# Patient Record
Sex: FEMALE | Race: WHITE | NOT HISPANIC OR LATINO | Employment: PART TIME | URBAN - METROPOLITAN AREA
[De-identification: names, ages, dates, MRNs, and addresses within clinical notes are randomized per-mention and may not be internally consistent; named-entity substitution may affect disease eponyms.]

---

## 2020-07-19 ENCOUNTER — LAB VISIT (OUTPATIENT)
Dept: PRIMARY CARE CLINIC | Facility: CLINIC | Age: 65
End: 2020-07-19

## 2020-07-19 DIAGNOSIS — Z00.6 RESEARCH STUDY PATIENT: ICD-10-CM

## 2020-07-19 PROCEDURE — U0003 INFECTIOUS AGENT DETECTION BY NUCLEIC ACID (DNA OR RNA); SEVERE ACUTE RESPIRATORY SYNDROME CORONAVIRUS 2 (SARS-COV-2) (CORONAVIRUS DISEASE [COVID-19]), AMPLIFIED PROBE TECHNIQUE, MAKING USE OF HIGH THROUGHPUT TECHNOLOGIES AS DESCRIBED BY CMS-2020-01-R: HCPCS

## 2020-07-19 PROCEDURE — 86769 SARS-COV-2 COVID-19 ANTIBODY: CPT

## 2020-07-19 NOTE — RESEARCH
Date of Consent: 7/19/2020    Sponsor: [facility]    Study Title/IRB Number: Observational study of Sars-CoV2 Immunoglobulin G (IgG) seroprevalence among the [facility] population over time 2020.163  Principle Investigator: Ladonna Bashir, PhD    Did the patient need translation services? No   name: N/A    Prior to the Informed Consent (IC) being signed, or any study protocol required data collection, testing, procedure, or intervention being performed, the following was done and/or discussed:   Patient was given a paper copy of the IC for review    Patient was given study FAQ   Purpose of the study and qualifications to participate    Study design and tests or procedures done at this visit   Confidentiality and HIPAA Authorization for Release of Medical Records for the research trial/ subject's rights/research related injury   Risk, Benefits, Alternative Treatments, Compensation and Costs   Participation in the research trial is voluntary and patient may withdraw at anytime   Contact information for study related questions    Patient verbalizes understanding of the above: Yes  Contact information for PI and IRB given to patient: Yes  Patient able to adequately summarize: the purpose of the study, the risks associated with the study, and all procedures, testing, and follow-ups associated with the study: Yes    The consent was discussed verbally with the patient and all questions were answered satisfactorily. Patient gave verbal consent for the Seroprevalence research study with an IRB approval date of 06/23/2020.      The Consent, Consent Witness and name of Clinical Research Coordinator consenting was captured and documented in REDCap.    All Inclusion and Exclusion Criteria reviewed, subject meets all Inclusion criteria and does not meet any Exclusion Criteria at this time.     Patient Eligibility was confirmed.    Patient responded to survey questions.    The following biospecimen  collection procedures were collected:    -Nasopharyngeal Swab Collection  -Blood collection

## 2020-07-20 LAB — SARS-COV-2 IGG SERPLBLD QL IA.RAPID: NEGATIVE

## 2020-07-23 LAB — SARS-COV-2 RNA RESP QL NAA+PROBE: NOT DETECTED

## 2021-10-13 ENCOUNTER — OFFICE VISIT (OUTPATIENT)
Dept: PODIATRY | Facility: CLINIC | Age: 66
End: 2021-10-13
Payer: COMMERCIAL

## 2021-10-13 VITALS — WEIGHT: 250 LBS | HEIGHT: 63 IN | BODY MASS INDEX: 44.3 KG/M2

## 2021-10-13 DIAGNOSIS — L03.032 PARONYCHIA OF SECOND TOE OF LEFT FOOT: Primary | ICD-10-CM

## 2021-10-13 PROCEDURE — 11750 EXCISION NAIL&NAIL MATRIX: CPT | Mod: T1,S$GLB,, | Performed by: PODIATRIST

## 2021-10-13 PROCEDURE — 99203 OFFICE O/P NEW LOW 30 MIN: CPT | Mod: 25,S$GLB,, | Performed by: PODIATRIST

## 2021-10-13 PROCEDURE — 99203 PR OFFICE/OUTPT VISIT, NEW, LEVL III, 30-44 MIN: ICD-10-PCS | Mod: 25,S$GLB,, | Performed by: PODIATRIST

## 2021-10-13 PROCEDURE — 11750 PR REMOVAL OF NAIL BED: ICD-10-PCS | Mod: T1,S$GLB,, | Performed by: PODIATRIST

## 2021-10-13 PROCEDURE — 99999 PR PBB SHADOW E&M-EST. PATIENT-LVL III: ICD-10-PCS | Mod: PBBFAC,,, | Performed by: PODIATRIST

## 2021-10-13 PROCEDURE — 99999 PR PBB SHADOW E&M-EST. PATIENT-LVL III: CPT | Mod: PBBFAC,,, | Performed by: PODIATRIST

## 2021-10-13 RX ORDER — LEVOTHYROXINE SODIUM 150 MCG
150 TABLET ORAL DAILY
COMMUNITY
Start: 2021-08-21 | End: 2021-11-23 | Stop reason: SDUPTHER

## 2021-10-13 RX ORDER — LISINOPRIL 10 MG/1
10 TABLET ORAL DAILY
COMMUNITY
Start: 2021-08-21 | End: 2021-11-23 | Stop reason: SDUPTHER

## 2021-11-15 ENCOUNTER — OFFICE VISIT (OUTPATIENT)
Dept: PODIATRY | Facility: CLINIC | Age: 66
End: 2021-11-15
Payer: COMMERCIAL

## 2021-11-15 VITALS — WEIGHT: 250 LBS | HEIGHT: 63 IN | BODY MASS INDEX: 44.3 KG/M2

## 2021-11-15 DIAGNOSIS — S90.211A SUBUNGUAL HEMATOMA OF GREAT TOE OF RIGHT FOOT, INITIAL ENCOUNTER: ICD-10-CM

## 2021-11-15 DIAGNOSIS — S90.222A SUBUNGUAL HEMATOMA OF SECOND TOE OF LEFT FOOT, INITIAL ENCOUNTER: ICD-10-CM

## 2021-11-15 DIAGNOSIS — S90.221A SUBUNGUAL HEMATOMA OF SECOND TOE OF RIGHT FOOT, INITIAL ENCOUNTER: ICD-10-CM

## 2021-11-15 DIAGNOSIS — B35.1 ONYCHOMYCOSIS DUE TO DERMATOPHYTE: Primary | ICD-10-CM

## 2021-11-15 PROCEDURE — 99214 PR OFFICE/OUTPT VISIT, EST, LEVL IV, 30-39 MIN: ICD-10-PCS | Mod: S$GLB,,, | Performed by: PODIATRIST

## 2021-11-15 PROCEDURE — 99999 PR PBB SHADOW E&M-EST. PATIENT-LVL III: CPT | Mod: PBBFAC,,, | Performed by: PODIATRIST

## 2021-11-15 PROCEDURE — 99214 OFFICE O/P EST MOD 30 MIN: CPT | Mod: S$GLB,,, | Performed by: PODIATRIST

## 2021-11-15 PROCEDURE — 99999 PR PBB SHADOW E&M-EST. PATIENT-LVL III: ICD-10-PCS | Mod: PBBFAC,,, | Performed by: PODIATRIST

## 2021-11-15 RX ORDER — TERBINAFINE HYDROCHLORIDE 250 MG/1
250 TABLET ORAL DAILY
Qty: 90 TABLET | Refills: 0 | Status: SHIPPED | OUTPATIENT
Start: 2021-11-15 | End: 2022-02-13

## 2021-11-23 PROBLEM — F41.1 GAD (GENERALIZED ANXIETY DISORDER): Status: ACTIVE | Noted: 2021-11-23

## 2021-11-23 PROBLEM — E03.8 OTHER SPECIFIED HYPOTHYROIDISM: Status: ACTIVE | Noted: 2021-11-23

## 2021-11-23 PROBLEM — I10 PRIMARY HYPERTENSION: Status: ACTIVE | Noted: 2021-11-23

## 2022-04-24 ENCOUNTER — HOSPITAL ENCOUNTER (EMERGENCY)
Facility: OTHER | Age: 67
Discharge: HOME | End: 2022-04-25
Attending: EMERGENCY MEDICINE
Payer: COMMERCIAL

## 2022-04-24 DIAGNOSIS — R55 SYNCOPE: ICD-10-CM

## 2022-04-24 DIAGNOSIS — F10.920 ALCOHOLIC INTOXICATION WITHOUT COMPLICATION: Primary | ICD-10-CM

## 2022-04-24 LAB
ALBUMIN SERPL BCP-MCNC: 3.8 G/DL (ref 3.5–5.2)
ALP SERPL-CCNC: 79 U/L (ref 55–135)
ALT SERPL W/O P-5'-P-CCNC: 18 U/L (ref 10–44)
ANION GAP SERPL CALC-SCNC: 14 MMOL/L (ref 8–16)
AST SERPL-CCNC: 17 U/L (ref 10–40)
BASOPHILS # BLD AUTO: 0.02 K/UL (ref 0–0.2)
BASOPHILS NFR BLD: 0.3 % (ref 0–1.9)
BILIRUB SERPL-MCNC: 0.5 MG/DL (ref 0.1–1)
BUN SERPL-MCNC: 12 MG/DL (ref 8–23)
CALCIUM SERPL-MCNC: 9.1 MG/DL (ref 8.7–10.5)
CHLORIDE SERPL-SCNC: 105 MMOL/L (ref 95–110)
CO2 SERPL-SCNC: 21 MMOL/L (ref 23–29)
CREAT SERPL-MCNC: 0.7 MG/DL (ref 0.5–1.4)
DIFFERENTIAL METHOD: ABNORMAL
EOSINOPHIL # BLD AUTO: 0 K/UL (ref 0–0.5)
EOSINOPHIL NFR BLD: 0.2 % (ref 0–8)
ERYTHROCYTE [DISTWIDTH] IN BLOOD BY AUTOMATED COUNT: 12.2 % (ref 11.5–14.5)
EST. GFR  (AFRICAN AMERICAN): >60 ML/MIN/1.73 M^2
EST. GFR  (NON AFRICAN AMERICAN): >60 ML/MIN/1.73 M^2
ETHANOL SERPL-MCNC: 204 MG/DL
GLUCOSE SERPL-MCNC: 126 MG/DL (ref 70–110)
HCT VFR BLD AUTO: 46.9 % (ref 37–48.5)
HGB BLD-MCNC: 15.6 G/DL (ref 12–16)
IMM GRANULOCYTES # BLD AUTO: 0.02 K/UL (ref 0–0.04)
IMM GRANULOCYTES NFR BLD AUTO: 0.3 % (ref 0–0.5)
LYMPHOCYTES # BLD AUTO: 1.2 K/UL (ref 1–4.8)
LYMPHOCYTES NFR BLD: 18.4 % (ref 18–48)
MCH RBC QN AUTO: 31.9 PG (ref 27–31)
MCHC RBC AUTO-ENTMCNC: 33.3 G/DL (ref 32–36)
MCV RBC AUTO: 96 FL (ref 82–98)
MONOCYTES # BLD AUTO: 0.4 K/UL (ref 0.3–1)
MONOCYTES NFR BLD: 6.4 % (ref 4–15)
NEUTROPHILS # BLD AUTO: 4.6 K/UL (ref 1.8–7.7)
NEUTROPHILS NFR BLD: 74.4 % (ref 38–73)
NRBC BLD-RTO: 0 /100 WBC
PLATELET # BLD AUTO: 242 K/UL (ref 150–450)
PMV BLD AUTO: 9.2 FL (ref 9.2–12.9)
POCT GLUCOSE: 109 MG/DL (ref 70–110)
POTASSIUM SERPL-SCNC: 3.8 MMOL/L (ref 3.5–5.1)
PROT SERPL-MCNC: 7.1 G/DL (ref 6–8.4)
RBC # BLD AUTO: 4.89 M/UL (ref 4–5.4)
SODIUM SERPL-SCNC: 140 MMOL/L (ref 136–145)
WBC # BLD AUTO: 6.24 K/UL (ref 3.9–12.7)

## 2022-04-24 PROCEDURE — 96361 HYDRATE IV INFUSION ADD-ON: CPT

## 2022-04-24 PROCEDURE — 93010 ELECTROCARDIOGRAM REPORT: CPT | Mod: ,,, | Performed by: INTERNAL MEDICINE

## 2022-04-24 PROCEDURE — 80053 COMPREHEN METABOLIC PANEL: CPT | Performed by: EMERGENCY MEDICINE

## 2022-04-24 PROCEDURE — 99284 EMERGENCY DEPT VISIT MOD MDM: CPT | Mod: 25

## 2022-04-24 PROCEDURE — 82077 ASSAY SPEC XCP UR&BREATH IA: CPT | Performed by: EMERGENCY MEDICINE

## 2022-04-24 PROCEDURE — 85025 COMPLETE CBC W/AUTO DIFF WBC: CPT | Performed by: EMERGENCY MEDICINE

## 2022-04-24 PROCEDURE — 93005 ELECTROCARDIOGRAM TRACING: CPT

## 2022-04-24 PROCEDURE — 96360 HYDRATION IV INFUSION INIT: CPT

## 2022-04-24 PROCEDURE — 82962 GLUCOSE BLOOD TEST: CPT

## 2022-04-24 PROCEDURE — 93010 EKG 12-LEAD: ICD-10-PCS | Mod: ,,, | Performed by: INTERNAL MEDICINE

## 2022-04-24 PROCEDURE — 25000003 PHARM REV CODE 250: Performed by: EMERGENCY MEDICINE

## 2022-04-24 RX ADMIN — SODIUM CHLORIDE 1000 ML: 0.9 INJECTION, SOLUTION INTRAVENOUS at 08:04

## 2022-04-25 VITALS
WEIGHT: 293 LBS | TEMPERATURE: 98 F | OXYGEN SATURATION: 99 % | DIASTOLIC BLOOD PRESSURE: 95 MMHG | HEART RATE: 71 BPM | RESPIRATION RATE: 13 BRPM | BODY MASS INDEX: 48.82 KG/M2 | HEIGHT: 65 IN | SYSTOLIC BLOOD PRESSURE: 136 MMHG

## 2022-04-25 NOTE — ED PROVIDER NOTES
"Encounter Date: 4/24/2022    SCRIBE #1 NOTE: I, Brody Marley, am scribing for, and in the presence of, Anival English MD.       History     Chief Complaint   Patient presents with    Alcohol Intoxication     Pt found at [facility] +ETOH.  Pt took at home Seroquel this morning as prescribed     Time seen by provider: 8:55 PM    This is a 66 y.o. female, with a PMHx of HTN and hypothyroidism, who presents to the ED with alcohol intoxication. Patient states she is from [place] and came to [place] to meet up with her friends from [place]. She reports drinking 4-5 drinks of liquor today and then "blacking out." Patient is unsure of what happened. She denies headache, neck pain, dysuria, cough, congestion or rhinorrhea. Patient notes she also went out 2 nights ago and drank more than she did today and still felt okay. She mentions that she does not usually drink alcohol. However, her father passed away 4 weeks ago, and she has been having a hard time. Patient states her daily dosage of Lexapro was recently changed from 10 mg to 20 mg 4 days ago. She took the medication this morning. Patient endorses medical compliance to her blood pressure medication Lisinopril. She is allergic to Ceftin. Patient reports smoking cigars occasionally, but denies any street drug use. She notes she had weight loss surgery 2 years ago. Patient does not have a PMHx of seizures. This is the extent of the patient's complaints at this time.    The history is provided by the patient.     Review of patient's allergies indicates:   Allergen Reactions    Cefuroxime axetil Hives, Itching, Rash and Swelling     Past Medical History:   Diagnosis Date    Anxiety     Anxiety state 4/28/2017 1:13:05 PM    [facility] - [facility]: Anxiety-No Additional Notes    Arthritis     Breast disorder     Complications affecting other specified body systems, hypertension 3/13/2012 1:13:09 PM    [facility] Historical - " Cardiovascular: Hypertension-No Additional Notes    Hypertension     Hypothyroidism     Hypothyroidism 4/28/2017 1:13:01 PM    [facility] Historical - DO NOT USE: Hypothyroidism-No Additional Notes    Shingles     2011     Past Surgical History:   Procedure Laterality Date    BREAST BIOPSY  12/2021    gastric sleeve  2019    HYSTERECTOMY  2012    [facility] BSO- DUB, cervical dysplasia    KNEE SURGERY      THYROIDECTOMY       Family History   Problem Relation Age of Onset    Epilepsy Mother     Cancer Mother     Heart disease Mother     Hypertension Mother     COPD Father     Heart disease Father     Arthritis Father     Diabetes Maternal Grandmother     Diabetes type I Maternal Grandmother     Heart disease Maternal Grandfather     Stroke Paternal Grandmother     Breast cancer Maternal Aunt      Social History     Tobacco Use    Smoking status: Never Smoker    Smokeless tobacco: Never Used   Substance Use Topics    Alcohol use: Not Currently    Drug use: Never     Review of Systems   Constitutional: Negative for chills and fever.   HENT: Negative for congestion, rhinorrhea and sore throat.    Eyes: Negative for photophobia and redness.   Respiratory: Negative for cough and shortness of breath.    Cardiovascular: Negative for chest pain.   Gastrointestinal: Negative for abdominal pain, nausea and vomiting.   Genitourinary: Negative for dysuria.   Musculoskeletal: Negative for neck pain.   Skin: Negative for rash.   Neurological: Negative for weakness, light-headedness and headaches.   Psychiatric/Behavioral: Negative for confusion.       Physical Exam     Initial Vitals [04/24/22 2128]   BP Pulse Resp Temp SpO2   (!) 155/90 (!) 59 18 98 °F (36.7 °C) 99 %      MAP       --         Physical Exam    Nursing note and vitals reviewed.  Constitutional: She appears well-developed and well-nourished. She is not diaphoretic. No distress.   HENT:   Head: Normocephalic and atraumatic.   Mouth/Throat:  Oropharynx is clear and moist and mucous membranes are normal.   Sticky mucous membranes.    Eyes: Conjunctivae and EOM are normal. Pupils are equal, round, and reactive to light. No scleral icterus.   Neck: Neck supple.   Normal range of motion.  Cardiovascular: Normal rate, regular rhythm, S1 normal and S2 normal.   No murmur heard.  Pulmonary/Chest: Breath sounds normal. No respiratory distress.   Abdominal: Abdomen is soft. Bowel sounds are normal. There is no abdominal tenderness.   Musculoskeletal:         General: No tenderness or edema. Normal range of motion.      Cervical back: Normal range of motion and neck supple.     Lymphadenopathy:     She has no cervical adenopathy.   Neurological: She is alert and oriented to person, place, and time.   Skin: Skin is warm and dry. Capillary refill takes less than 2 seconds. No rash noted. No pallor.   Psychiatric: She has a normal mood and affect. Thought content normal.         ED Course   Procedures  Labs Reviewed   CBC W/ AUTO DIFFERENTIAL - Abnormal; Notable for the following components:       Result Value    MCH 31.9 (*)     Gran % 74.4 (*)     All other components within normal limits   COMPREHENSIVE METABOLIC PANEL - Abnormal; Notable for the following components:    CO2 21 (*)     Glucose 126 (*)     All other components within normal limits   ALCOHOL,MEDICAL (ETHANOL) - Abnormal; Notable for the following components:    Alcohol, Serum 204 (*)     All other components within normal limits   POCT GLUCOSE     EKG Readings: (Independently Interpreted)   Normal sinus rhythm at rate of 60 bpm. No acute ST/T wave changes. No STEMI.      ECG Results          EKG 12-lead (Final result)  Result time 04/25/22 21:40:40    Final result by Interface, Lab In [facility] (04/25/22 21:40:40)                 Narrative:    Test Reason : R55,    Vent. Rate : 060 BPM     Atrial Rate : 060 BPM     P-R Int : 198 ms          QRS Dur : 084 ms      QT Int : 444 ms       P-R-T Axes : 061  070 039 degrees     QTc Int : 444 ms    Normal sinus rhythm  Low voltage QRS  Borderline Abnormal ECG    Confirmed by Jeanne BLANK, Harpreet DAVID ([id number]) on 4/25/2022 9:40:29 PM    Referred By: JOHAN   SELF           Confirmed By:Harpreet Angel MD                            Imaging Results    None          Medications   sodium chloride 0.9% bolus 1,000 mL (0 mLs Intravenous Stopped 4/24/22 2215)     Medical Decision Making:   History:   Old Medical Records: I decided to obtain old medical records.  Independently Interpreted Test(s):   I have ordered and independently interpreted EKG Reading(s) - see prior notes  Clinical Tests:   Lab Tests: Ordered and Reviewed  Medical Tests: Ordered and Reviewed          Scribe Attestation:   Scribe #1: I performed the above scribed service and the documentation accurately describes the services I performed. I attest to the accuracy of the note.    Attending Attestation:             Attending ED Notes:   Emergent evaluation of a 66-year-old female who is brought to the emergency department by EMS after being found down from alcohol intoxication.  Patient denies trauma.  No clinical evidence of trauma.  Patient is afebrile, nontoxic-appearing stable vital signs.  Patient has no elevation white blood cell count.  H&H within normal limits.  No acute findings on CMP.  Alcohol level is 204. Patient is neurovascularly intact without focal neurologic deficits. Cranial nerves II-XII intact. Strength 5/5 throughout. Sensation intact to light touch throughout. Good finger to nose task ability. Negative pronator drift. Two point discrimination is intact throughout. Reflexes 2+ throughout. No papilledema.  No meningeal signs. PERRLA. EOMI.  Patient is observed in the emergency department for sobriety.  On discharge patient is alert oriented x4 without evidence of acute intoxication, slurred speech, nystagmus and altered gait.  Patient is counseled on not drinking alcohol while taking her Lexapro.                   Physician Attestation for Scribe: I, Anival English, reviewed documentation as scribed in my presence, which is both accurate and complete.    Clinical Impression:   Final diagnoses:  [R55] Syncope  [F10.920] Alcoholic intoxication without complication (Primary)          ED Disposition Condition    Discharge Good        ED Prescriptions     None        Follow-up Information     Follow up With Specialties Details Why Contact Info    Melisa Vaughan NP Family Medicine In 2 days  [address] [facility] [address] [phone]             Anival English MD  04/26/22 0647

## 2023-10-27 ENCOUNTER — LAB VISIT (OUTPATIENT)
Dept: LAB | Facility: HOSPITAL | Age: 68
End: 2023-10-27
Attending: NURSE PRACTITIONER
Payer: MEDICARE

## 2023-10-27 DIAGNOSIS — F33.1 MAJOR DEPRESSIVE DISORDER, RECURRENT EPISODE, MODERATE: Primary | ICD-10-CM

## 2023-10-27 LAB
T4 FREE SERPL-MCNC: 0.75 NG/DL (ref 0.71–1.51)
TSH SERPL DL<=0.005 MIU/L-ACNC: 10.96 UIU/ML (ref 0.4–4)

## 2023-10-27 PROCEDURE — 84443 ASSAY THYROID STIM HORMONE: CPT | Performed by: NURSE PRACTITIONER

## 2023-10-27 PROCEDURE — 84439 ASSAY OF FREE THYROXINE: CPT | Performed by: NURSE PRACTITIONER

## 2023-10-27 PROCEDURE — 36415 COLL VENOUS BLD VENIPUNCTURE: CPT | Mod: PO | Performed by: NURSE PRACTITIONER

## 2024-08-16 ENCOUNTER — HOSPITAL ENCOUNTER (EMERGENCY)
Facility: HOSPITAL | Age: 69
Discharge: HOME | End: 2024-08-16
Attending: EMERGENCY MEDICINE
Payer: MEDICARE

## 2024-08-16 VITALS
WEIGHT: 252 LBS | DIASTOLIC BLOOD PRESSURE: 59 MMHG | TEMPERATURE: 97 F | OXYGEN SATURATION: 96 % | BODY MASS INDEX: 41.99 KG/M2 | HEART RATE: 69 BPM | RESPIRATION RATE: 16 BRPM | SYSTOLIC BLOOD PRESSURE: 120 MMHG | HEIGHT: 65 IN

## 2024-08-16 DIAGNOSIS — R19.7 NAUSEA VOMITING AND DIARRHEA: ICD-10-CM

## 2024-08-16 DIAGNOSIS — R31.9 URINARY TRACT INFECTION WITH HEMATURIA, SITE UNSPECIFIED: ICD-10-CM

## 2024-08-16 DIAGNOSIS — N39.0 URINARY TRACT INFECTION WITH HEMATURIA, SITE UNSPECIFIED: ICD-10-CM

## 2024-08-16 DIAGNOSIS — R06.82 TACHYPNEA: Primary | ICD-10-CM

## 2024-08-16 DIAGNOSIS — R11.2 NAUSEA VOMITING AND DIARRHEA: ICD-10-CM

## 2024-08-16 LAB
ALBUMIN SERPL BCP-MCNC: 3.6 G/DL (ref 3.5–5.2)
ALP SERPL-CCNC: 84 U/L (ref 55–135)
ALT SERPL W/O P-5'-P-CCNC: 9 U/L (ref 10–44)
ANION GAP SERPL CALC-SCNC: 12 MMOL/L (ref 8–16)
AST SERPL-CCNC: 14 U/L (ref 10–40)
BACTERIA #/AREA URNS HPF: ABNORMAL /HPF
BASOPHILS # BLD AUTO: 0.02 K/UL (ref 0–0.2)
BASOPHILS NFR BLD: 0.3 % (ref 0–1.9)
BILIRUB SERPL-MCNC: 0.9 MG/DL (ref 0.1–1)
BILIRUB UR QL STRIP: NEGATIVE
BNP SERPL-MCNC: 20 PG/ML (ref 0–99)
BUN SERPL-MCNC: 14 MG/DL (ref 8–23)
CALCIUM SERPL-MCNC: 9.1 MG/DL (ref 8.7–10.5)
CHLORIDE SERPL-SCNC: 107 MMOL/L (ref 95–110)
CLARITY UR: ABNORMAL
CO2 SERPL-SCNC: 17 MMOL/L (ref 23–29)
COLOR UR: ABNORMAL
CREAT SERPL-MCNC: 0.9 MG/DL (ref 0.5–1.4)
DIFFERENTIAL METHOD BLD: ABNORMAL
EOSINOPHIL # BLD AUTO: 0 K/UL (ref 0–0.5)
EOSINOPHIL NFR BLD: 0.3 % (ref 0–8)
ERYTHROCYTE [DISTWIDTH] IN BLOOD BY AUTOMATED COUNT: 12.7 % (ref 11.5–14.5)
EST. GFR  (NO RACE VARIABLE): >60 ML/MIN/1.73 M^2
GLUCOSE SERPL-MCNC: 92 MG/DL (ref 70–110)
GLUCOSE UR QL STRIP: NEGATIVE
HCT VFR BLD AUTO: 45.4 % (ref 37–48.5)
HGB BLD-MCNC: 15.4 G/DL (ref 12–16)
HGB UR QL STRIP: ABNORMAL
HYALINE CASTS #/AREA URNS LPF: 0 /LPF
IMM GRANULOCYTES # BLD AUTO: 0.03 K/UL (ref 0–0.04)
IMM GRANULOCYTES NFR BLD AUTO: 0.4 % (ref 0–0.5)
KETONES UR QL STRIP: NEGATIVE
LEUKOCYTE ESTERASE UR QL STRIP: ABNORMAL
LIPASE SERPL-CCNC: 30 U/L (ref 4–60)
LYMPHOCYTES # BLD AUTO: 0.7 K/UL (ref 1–4.8)
LYMPHOCYTES NFR BLD: 9.6 % (ref 18–48)
MCH RBC QN AUTO: 30.2 PG (ref 27–31)
MCHC RBC AUTO-ENTMCNC: 33.9 G/DL (ref 32–36)
MCV RBC AUTO: 89 FL (ref 82–98)
MICROSCOPIC COMMENT: ABNORMAL
MONOCYTES # BLD AUTO: 0.3 K/UL (ref 0.3–1)
MONOCYTES NFR BLD: 4.1 % (ref 4–15)
NEUTROPHILS # BLD AUTO: 6.3 K/UL (ref 1.8–7.7)
NEUTROPHILS NFR BLD: 85.3 % (ref 38–73)
NITRITE UR QL STRIP: NEGATIVE
NRBC BLD-RTO: 0 /100 WBC
PH UR STRIP: 6 [PH] (ref 5–8)
PLATELET # BLD AUTO: 289 K/UL (ref 150–450)
PMV BLD AUTO: 9.5 FL (ref 9.2–12.9)
POTASSIUM SERPL-SCNC: 3.8 MMOL/L (ref 3.5–5.1)
PROT SERPL-MCNC: 6.8 G/DL (ref 6–8.4)
PROT UR QL STRIP: ABNORMAL
RBC # BLD AUTO: 5.1 M/UL (ref 4–5.4)
RBC #/AREA URNS HPF: 36 /HPF (ref 0–4)
SODIUM SERPL-SCNC: 136 MMOL/L (ref 136–145)
SP GR UR STRIP: >1.03 (ref 1–1.03)
SQUAMOUS #/AREA URNS HPF: >100 /HPF
TROPONIN I SERPL DL<=0.01 NG/ML-MCNC: <0.006 NG/ML (ref 0–0.03)
URN SPEC COLLECT METH UR: ABNORMAL
UROBILINOGEN UR STRIP-ACNC: NEGATIVE EU/DL
WBC # BLD AUTO: 7.38 K/UL (ref 3.9–12.7)
WBC #/AREA URNS HPF: >100 /HPF (ref 0–5)

## 2024-08-16 PROCEDURE — 63600175 PHARM REV CODE 636 W HCPCS: Performed by: NURSE PRACTITIONER

## 2024-08-16 PROCEDURE — 96374 THER/PROPH/DIAG INJ IV PUSH: CPT

## 2024-08-16 PROCEDURE — 93010 ELECTROCARDIOGRAM REPORT: CPT | Mod: ,,, | Performed by: INTERNAL MEDICINE

## 2024-08-16 PROCEDURE — 83880 ASSAY OF NATRIURETIC PEPTIDE: CPT | Performed by: NURSE PRACTITIONER

## 2024-08-16 PROCEDURE — 80053 COMPREHEN METABOLIC PANEL: CPT | Performed by: NURSE PRACTITIONER

## 2024-08-16 PROCEDURE — 84484 ASSAY OF TROPONIN QUANT: CPT | Performed by: NURSE PRACTITIONER

## 2024-08-16 PROCEDURE — 25000003 PHARM REV CODE 250: Performed by: NURSE PRACTITIONER

## 2024-08-16 PROCEDURE — 99285 EMERGENCY DEPT VISIT HI MDM: CPT | Mod: 25

## 2024-08-16 PROCEDURE — 83690 ASSAY OF LIPASE: CPT | Performed by: NURSE PRACTITIONER

## 2024-08-16 PROCEDURE — 85025 COMPLETE CBC W/AUTO DIFF WBC: CPT | Performed by: NURSE PRACTITIONER

## 2024-08-16 PROCEDURE — 93005 ELECTROCARDIOGRAM TRACING: CPT

## 2024-08-16 PROCEDURE — 87086 URINE CULTURE/COLONY COUNT: CPT | Performed by: NURSE PRACTITIONER

## 2024-08-16 PROCEDURE — 81000 URINALYSIS NONAUTO W/SCOPE: CPT | Performed by: NURSE PRACTITIONER

## 2024-08-16 RX ORDER — SODIUM CHLORIDE 9 MG/ML
1000 INJECTION, SOLUTION INTRAVENOUS
Status: COMPLETED | OUTPATIENT
Start: 2024-08-16 | End: 2024-08-16

## 2024-08-16 RX ORDER — SULFAMETHOXAZOLE AND TRIMETHOPRIM 800; 160 MG/1; MG/1
1 TABLET ORAL 2 TIMES DAILY
Qty: 6 TABLET | Refills: 0 | Status: SHIPPED | OUTPATIENT
Start: 2024-08-16 | End: 2024-08-19

## 2024-08-16 RX ORDER — ONDANSETRON 4 MG/1
4 TABLET, FILM COATED ORAL EVERY 8 HOURS PRN
Qty: 12 TABLET | Refills: 0 | Status: SHIPPED | OUTPATIENT
Start: 2024-08-16 | End: 2024-08-20

## 2024-08-16 RX ORDER — ONDANSETRON HYDROCHLORIDE 2 MG/ML
4 INJECTION, SOLUTION INTRAVENOUS
Status: COMPLETED | OUTPATIENT
Start: 2024-08-16 | End: 2024-08-16

## 2024-08-16 RX ADMIN — SODIUM CHLORIDE 1000 ML: 9 INJECTION, SOLUTION INTRAVENOUS at 03:08

## 2024-08-16 RX ADMIN — ONDANSETRON 4 MG: 2 INJECTION INTRAMUSCULAR; INTRAVENOUS at 03:08

## 2024-08-16 NOTE — ED NOTES
Bed: [facility] 03  Expected date:   Expected time:   Means of arrival:   Comments:

## 2024-08-16 NOTE — ED PROVIDER NOTES
Encounter Date: 8/16/2024       History     Chief Complaint   Patient presents with    Vomiting    Diarrhea     Vomiting and diarrhea since last pm, denies abdominal pain.     Patient presents to ER for nausea, vomiting, diarrhea, onset last night after eating a sandwich.  Symptoms have been constant since onset.  She has tried nothing for the symptoms.  She denies abdominal pain, dysuria, flank pain, fever, chills, generalized body aches, weakness, fatigue.    The history is provided by the patient.     Review of patient's allergies indicates:   Allergen Reactions    Cefuroxime axetil Hives, Itching, Rash and Swelling     Past Medical History:   Diagnosis Date    Anxiety     Anxiety state 4/28/2017 1:13:05 PM    [facility] Historical - [facility]: Anxiety-No Additional Notes    Arthritis     Breast disorder     Complications affecting other specified body systems, hypertension 3/13/2012 1:13:09 PM    [facility] Historical - Cardiovascular: Hypertension-No Additional Notes    Hypertension     Hypothyroidism     Hypothyroidism 4/28/2017 1:13:01 PM    [facility] Historical - DO NOT USE: Hypothyroidism-No Additional Notes    Shingles     2011     Past Surgical History:   Procedure Laterality Date    BREAST BIOPSY  12/2021    gastric sleeve  2019    HYSTERECTOMY  2012    [facility] BSO- DUB, cervical dysplasia    KNEE SURGERY Bilateral     THYROIDECTOMY       Family History   Problem Relation Name Age of Onset    Epilepsy Mother      Cancer Mother      Heart disease Mother      Hypertension Mother      COPD Father      Heart disease Father      Arthritis Father      Diabetes Maternal Grandmother      Diabetes type I Maternal Grandmother      Heart disease Maternal Grandfather      Stroke Paternal Grandmother      Breast cancer Maternal Aunt       Social History     Tobacco Use    Smoking status: Never    Smokeless tobacco: Never   Substance Use Topics    Alcohol use: Yes    Drug use: Never     Review of Systems    Constitutional:  Negative for chills, fatigue and fever.   HENT:  Negative for ear pain.    Eyes:  Negative for pain.   Respiratory:  Negative for cough and shortness of breath.    Cardiovascular:  Negative for chest pain.   Gastrointestinal:  Positive for diarrhea, nausea and vomiting. Negative for abdominal pain and constipation.   Genitourinary:  Negative for dysuria and flank pain.   Musculoskeletal:  Negative for back pain, myalgias and neck pain.   Skin:  Negative for rash.   Neurological:  Negative for weakness and headaches.   All other systems reviewed and are negative.      Physical Exam     Initial Vitals [08/16/24 1425]   BP Pulse Resp Temp SpO2   131/81 (!) 116 18 98.7 °F (37.1 °C) 98 %      MAP       --         Physical Exam    Nursing note and vitals reviewed.  Constitutional: She is not diaphoretic. She is cooperative.  Non-toxic appearance. No distress.   HENT:   Head: Normocephalic and atraumatic.   Eyes: Conjunctivae are normal.   Neck: Neck supple.   Normal range of motion.  Cardiovascular:  Regular rhythm.           +tachycardia 116 bpm   Pulmonary/Chest: Breath sounds normal. No respiratory distress.   Abdominal: Abdomen is soft. There is no abdominal tenderness.   No CVA tenderness.   Musculoskeletal:         General: Normal range of motion.      Cervical back: Normal range of motion and neck supple.     Neurological: She is alert and oriented to person, place, and time. She has normal strength. GCS score is 15. GCS eye subscore is 4. GCS verbal subscore is 5. GCS motor subscore is 6.   Skin: Skin is warm and dry. Capillary refill takes less than 2 seconds.         ED Course   Procedures  Labs Reviewed   CBC W/ AUTO DIFFERENTIAL - Abnormal       Result Value    WBC 7.38      RBC 5.10      Hemoglobin 15.4      Hematocrit 45.4      MCV 89      MCH 30.2      MCHC 33.9      RDW 12.7      Platelets 289      MPV 9.5      Immature Granulocytes 0.4      Gran # (ANC) 6.3      Immature Grans (Abs)  0.03      Lymph # 0.7 (*)     Mono # 0.3      Eos # 0.0      Baso # 0.02      nRBC 0      Gran % 85.3 (*)     Lymph % 9.6 (*)     Mono % 4.1      Eosinophil % 0.3      Basophil % 0.3      Differential Method Automated     URINALYSIS, REFLEX TO URINE CULTURE - Abnormal    Specimen UA Urine, Clean Catch      Color, UA Orange (*)     Appearance, UA Cloudy (*)     pH, UA 6.0      Specific Gravity, UA >1.030 (*)     Protein, UA 2+ (*)     Glucose, UA Negative      Ketones, UA Negative      Bilirubin (UA) Negative      Occult Blood UA Trace (*)     Nitrite, UA Negative      Urobilinogen, UA Negative      Leukocytes, UA 3+ (*)     Narrative:     Specimen Source->Urine   COMPREHENSIVE METABOLIC PANEL - Abnormal    Sodium 136      Potassium 3.8      Chloride 107      CO2 17 (*)     Glucose 92      BUN 14      Creatinine 0.9      Calcium 9.1      Total Protein 6.8      Albumin 3.6      Total Bilirubin 0.9      Alkaline Phosphatase 84      AST 14      ALT 9 (*)     eGFR >60      Anion Gap 12     URINALYSIS MICROSCOPIC - Abnormal    RBC, UA 36 (*)     WBC, UA >100 (*)     Bacteria Moderate (*)     Squam Epithel, UA >100      Hyaline Casts, UA 0      Microscopic Comment SEE COMMENT      Narrative:     Specimen Source->Urine   CULTURE, URINE   TROPONIN I    Troponin I <0.006     B-TYPE NATRIURETIC PEPTIDE    BNP 20     LIPASE    Lipase 30       EKG Readings: (Independently Interpreted)   Initial Reading: No STEMI. Rhythm: Normal Sinus Rhythm. Heart Rate: 66 bpm. Ectopy: PVCs.       Imaging Results              X-Ray Chest AP Portable (Final result)  Result time 08/16/24 16:17:10      Final result by Dereje Arriaga MD (08/16/24 16:17:10)                   Impression:      No acute abnormality.      Electronically signed by: Dereje Arriaga  Date:    08/16/2024  Time:    16:17               Narrative:    EXAMINATION:  XR CHEST AP PORTABLE    CLINICAL HISTORY:  Tachypnea, not elsewhere classified    TECHNIQUE:  Single frontal view of  the chest was performed.    COMPARISON:  None    FINDINGS:  The lungs are clear, with normal appearance of pulmonary vasculature and no pleural effusion or pneumothorax.    The cardiac silhouette is prominent.  The hilar and mediastinal contours are unremarkable.    Bones are intact.                                       Medications   0.9%  NaCl infusion (1,000 mLs Intravenous New Bag 8/16/24 1529)   ondansetron injection 4 mg (4 mg Intravenous Given 8/16/24 1531)     Medical Decision Making  Amount and/or Complexity of Data Reviewed  Labs: ordered.  Radiology: ordered.    Risk  Prescription drug management.                3:45 PM- nursing staff notified this provider that patient experiencing tachypnea at this time.  Re-examined patient, she is resting comfortably on ER chair.  Respirations are even and nonlabored.  Breath sounds clear to auscultation bilaterally.  She is in no acute distress.  She denies shortness of breath, but states she is consciously taking deeper breaths.  SpO2 100% at this time.  Remainder of vitals stable as well.  Additional orders placed at this time.          Upon reassessment of patient, she reports improvement of symptoms throughout ER visit.  She reports complete resolution of nausea and vomiting.  She is sitting up on ER chair, respirations even and nonlabored.  Speaking in full sentences.  She is in no acute distress.  Repeat abdominal exam unremarkable.  There is no abdominal tenderness to all 4 quadrants upon light and deep palpation.  Discussed bland diet and advancing as tolerated.  Discussed outpatient follow-up with her PCP.  Occasional PVCs noted on EKG, states she is established with a cardiologist, instructed patient to follow-up with her cardiologist as well.  She denies chest pain or shortness of breath.  Discussed signs and symptoms to return to ER.  Patient agrees with this plan and voiced no further concerns.    I discussed with patient  that evaluation in the ED does  not suggest any emergent or life threatening medical conditions requiring immediate intervention beyond what was provided in the ED, and I believe patient is safe for discharge. Regardless, an unremarkable evaluation in the ED does not preclude the development or presence of a serious of life threatening condition. As such, patient was instructed to return immediately for any worsening or change in current symptoms.               Clinical Impression:  Final diagnoses:  [R06.82] Tachypnea (Primary)  [R11.2, R19.7] Nausea vomiting and diarrhea  [N39.0, R31.9] Urinary tract infection with hematuria, site unspecified          ED Disposition Condition    Discharge Stable          ED Prescriptions       Medication Sig Dispense Start Date End Date Auth. Provider    ondansetron (ZOFRAN) 4 MG tablet Take 1 tablet (4 mg total) by mouth every 8 (eight) hours as needed for Nausea. 12 tablet 8/16/2024 8/20/2024 Adeel Henson NP    sulfamethoxazole-trimethoprim 800-160mg (BACTRIM DS) 800-160 mg Tab Take 1 tablet by mouth 2 (two) times daily. for 3 days 6 tablet 8/16/2024 8/19/2024 Adeel Henson NP          Follow-up Information       Follow up With Specialties Details Why Contact Info    Anastasia Herrera NP Internal Medicine In 2 days  [address] [facility] [address] [phone]      [facility] Emergency Dept. Emergency Medicine  As needed, If symptoms worsen [address] [facility] [address] [phone]             Adeel Henson NP  08/16/24 1504

## 2024-08-17 LAB
BACTERIA UR CULT: NORMAL
BACTERIA UR CULT: NORMAL
OHS QRS DURATION: 78 MS
OHS QTC CALCULATION: 425 MS